# Patient Record
Sex: MALE | Race: WHITE | Employment: FULL TIME | URBAN - METROPOLITAN AREA
[De-identification: names, ages, dates, MRNs, and addresses within clinical notes are randomized per-mention and may not be internally consistent; named-entity substitution may affect disease eponyms.]

---

## 2018-04-18 ENCOUNTER — APPOINTMENT (OUTPATIENT)
Dept: GENERAL RADIOLOGY | Facility: CLINIC | Age: 28
End: 2018-04-18
Attending: EMERGENCY MEDICINE
Payer: COMMERCIAL

## 2018-04-18 ENCOUNTER — HOSPITAL ENCOUNTER (EMERGENCY)
Facility: CLINIC | Age: 28
Discharge: HOME | End: 2018-04-18
Attending: EMERGENCY MEDICINE | Admitting: EMERGENCY MEDICINE
Payer: COMMERCIAL

## 2018-04-18 ENCOUNTER — APPOINTMENT (OUTPATIENT)
Dept: CT IMAGING | Facility: CLINIC | Age: 28
End: 2018-04-18
Attending: EMERGENCY MEDICINE
Payer: COMMERCIAL

## 2018-04-18 VITALS
HEIGHT: 71 IN | WEIGHT: 264 LBS | SYSTOLIC BLOOD PRESSURE: 123 MMHG | DIASTOLIC BLOOD PRESSURE: 52 MMHG | RESPIRATION RATE: 20 BRPM | BODY MASS INDEX: 36.96 KG/M2 | OXYGEN SATURATION: 96 % | TEMPERATURE: 97.4 F | HEART RATE: 89 BPM

## 2018-04-18 DIAGNOSIS — R60.0 LOWER EXTREMITY EDEMA: ICD-10-CM

## 2018-04-18 DIAGNOSIS — R63.5 WEIGHT GAIN: ICD-10-CM

## 2018-04-18 DIAGNOSIS — R14.0 ABDOMINAL DISTENSION: ICD-10-CM

## 2018-04-18 DIAGNOSIS — R53.83 OTHER FATIGUE: ICD-10-CM

## 2018-04-18 DIAGNOSIS — R73.9 HYPERGLYCEMIA: ICD-10-CM

## 2018-04-18 LAB
ALBUMIN SERPL-MCNC: 3.4 G/DL (ref 3.4–5)
ALBUMIN UR-MCNC: 10 MG/DL
ALP SERPL-CCNC: 105 U/L (ref 40–150)
ALT SERPL W P-5'-P-CCNC: 182 U/L (ref 0–70)
ANION GAP SERPL CALCULATED.3IONS-SCNC: 11 MMOL/L (ref 3–14)
APPEARANCE UR: CLEAR
AST SERPL W P-5'-P-CCNC: 89 U/L (ref 0–45)
BASOPHILS # BLD AUTO: 0 10E9/L (ref 0–0.2)
BASOPHILS NFR BLD AUTO: 0.2 %
BILIRUB SERPL-MCNC: 0.3 MG/DL (ref 0.2–1.3)
BILIRUB UR QL STRIP: NEGATIVE
BUN SERPL-MCNC: 9 MG/DL (ref 7–30)
CALCIUM SERPL-MCNC: 8.8 MG/DL (ref 8.5–10.1)
CHLORIDE SERPL-SCNC: 107 MMOL/L (ref 94–109)
CO2 SERPL-SCNC: 29 MMOL/L (ref 20–32)
COLOR UR AUTO: YELLOW
CREAT SERPL-MCNC: 0.7 MG/DL (ref 0.66–1.25)
DIFFERENTIAL METHOD BLD: NORMAL
EOSINOPHIL # BLD AUTO: 0.3 10E9/L (ref 0–0.7)
EOSINOPHIL NFR BLD AUTO: 3.7 %
ERYTHROCYTE [DISTWIDTH] IN BLOOD BY AUTOMATED COUNT: 12.1 % (ref 10–15)
GFR SERPL CREATININE-BSD FRML MDRD: >90 ML/MIN/1.7M2
GLUCOSE SERPL-MCNC: 208 MG/DL (ref 70–99)
GLUCOSE UR STRIP-MCNC: NEGATIVE MG/DL
HCT VFR BLD AUTO: 40.9 % (ref 40–53)
HGB BLD-MCNC: 14.3 G/DL (ref 13.3–17.7)
HGB UR QL STRIP: NEGATIVE
IMM GRANULOCYTES # BLD: 0.1 10E9/L (ref 0–0.4)
IMM GRANULOCYTES NFR BLD: 1 %
KETONES UR STRIP-MCNC: NEGATIVE MG/DL
LEUKOCYTE ESTERASE UR QL STRIP: NEGATIVE
LIPASE SERPL-CCNC: 73 U/L (ref 73–393)
LYMPHOCYTES # BLD AUTO: 1.9 10E9/L (ref 0.8–5.3)
LYMPHOCYTES NFR BLD AUTO: 22.2 %
MCH RBC QN AUTO: 29.9 PG (ref 26.5–33)
MCHC RBC AUTO-ENTMCNC: 35 G/DL (ref 31.5–36.5)
MCV RBC AUTO: 85 FL (ref 78–100)
MONOCYTES # BLD AUTO: 0.8 10E9/L (ref 0–1.3)
MONOCYTES NFR BLD AUTO: 9.4 %
MUCOUS THREADS #/AREA URNS LPF: PRESENT /LPF
NEUTROPHILS # BLD AUTO: 5.5 10E9/L (ref 1.6–8.3)
NEUTROPHILS NFR BLD AUTO: 63.5 %
NITRATE UR QL: NEGATIVE
NRBC # BLD AUTO: 0 10*3/UL
NRBC BLD AUTO-RTO: 0 /100
NT-PROBNP SERPL-MCNC: 53 PG/ML (ref 0–450)
PH UR STRIP: 5.5 PH (ref 5–7)
PLATELET # BLD AUTO: 215 10E9/L (ref 150–450)
POTASSIUM SERPL-SCNC: 3.7 MMOL/L (ref 3.4–5.3)
PROT SERPL-MCNC: 6.9 G/DL (ref 6.8–8.8)
RBC # BLD AUTO: 4.79 10E12/L (ref 4.4–5.9)
RBC #/AREA URNS AUTO: 1 /HPF (ref 0–2)
SODIUM SERPL-SCNC: 147 MMOL/L (ref 133–144)
SOURCE: ABNORMAL
SP GR UR STRIP: 1.02 (ref 1–1.03)
UROBILINOGEN UR STRIP-MCNC: NORMAL MG/DL (ref 0–2)
WBC # BLD AUTO: 8.7 10E9/L (ref 4–11)
WBC #/AREA URNS AUTO: 1 /HPF (ref 0–5)

## 2018-04-18 PROCEDURE — 76705 ECHO EXAM OF ABDOMEN: CPT | Performed by: EMERGENCY MEDICINE

## 2018-04-18 PROCEDURE — 81001 URINALYSIS AUTO W/SCOPE: CPT | Performed by: EMERGENCY MEDICINE

## 2018-04-18 PROCEDURE — 25000125 ZZHC RX 250: Performed by: EMERGENCY MEDICINE

## 2018-04-18 PROCEDURE — 99284 EMERGENCY DEPT VISIT MOD MDM: CPT | Mod: 25 | Performed by: EMERGENCY MEDICINE

## 2018-04-18 PROCEDURE — 76705 ECHO EXAM OF ABDOMEN: CPT | Mod: 26 | Performed by: EMERGENCY MEDICINE

## 2018-04-18 PROCEDURE — 83880 ASSAY OF NATRIURETIC PEPTIDE: CPT | Performed by: EMERGENCY MEDICINE

## 2018-04-18 PROCEDURE — 74177 CT ABD & PELVIS W/CONTRAST: CPT

## 2018-04-18 PROCEDURE — 85025 COMPLETE CBC W/AUTO DIFF WBC: CPT | Performed by: EMERGENCY MEDICINE

## 2018-04-18 PROCEDURE — 71046 X-RAY EXAM CHEST 2 VIEWS: CPT

## 2018-04-18 PROCEDURE — 25000128 H RX IP 250 OP 636: Performed by: EMERGENCY MEDICINE

## 2018-04-18 PROCEDURE — 99285 EMERGENCY DEPT VISIT HI MDM: CPT | Mod: 25 | Performed by: EMERGENCY MEDICINE

## 2018-04-18 PROCEDURE — 80053 COMPREHEN METABOLIC PANEL: CPT | Performed by: EMERGENCY MEDICINE

## 2018-04-18 PROCEDURE — 83690 ASSAY OF LIPASE: CPT | Performed by: EMERGENCY MEDICINE

## 2018-04-18 RX ORDER — IOPAMIDOL 755 MG/ML
100 INJECTION, SOLUTION INTRAVASCULAR ONCE
Status: COMPLETED | OUTPATIENT
Start: 2018-04-18 | End: 2018-04-18

## 2018-04-18 RX ADMIN — SODIUM CHLORIDE 50 ML: 9 INJECTION, SOLUTION INTRAVENOUS at 05:31

## 2018-04-18 RX ADMIN — IOPAMIDOL 100 ML: 755 INJECTION, SOLUTION INTRAVENOUS at 05:31

## 2018-04-18 ASSESSMENT — ENCOUNTER SYMPTOMS
DIARRHEA: 1
ABDOMINAL PAIN: 1
NAUSEA: 0
FEVER: 0
SHORTNESS OF BREATH: 0
ABDOMINAL DISTENTION: 1
VOMITING: 0
FREQUENCY: 1
COUGH: 0

## 2018-04-18 NOTE — DISCHARGE INSTRUCTIONS
Follow up with a primary care clinic within the next 4-5 days. Return to the ER with new or worsening symptoms.

## 2018-04-18 NOTE — ED PROVIDER NOTES
History     Chief Complaint   Patient presents with     Abdominal Pain     patient reports feeling bloated, with intermittent diarrhea, no vomiting episodes. patient reported that he noticed his abdomen enlarged and gaine 40 lbs for 3 months     Joint Swelling     +1 pitting edema     HPI  Ankit Lopez is a 27 year old male who presents to the emergency department with a multitude of complaints.  His significant other states that over the last 3 months she has gained approximately 40 pounds.  She has noticed that his legs seem swollen, his abdomen seems distended, he seems to have no energy.  He has had chronic diarrhea, stooling approximately 5 times a day for many months.  She states he has refused to see the doctor.  He does admit that sometimes he has right-sided abdominal pain, though denies this now.  He is somewhat vague on what this feels like.  He states that he urinates a fair amount, and sometimes his urine is quite dark.  He denies alcohol use.  He states that times he feels winded, but more so related to extreme fatigue rather than true shortness of breath.  No chest symptoms.  No vomiting.  No fever.  Tonight, significant other felt that his tongue was swollen, convinced him to come in.    Past Medical History:   Diagnosis Date     ADHD (attention deficit hyperactivity disorder)      Asthma      Bipolar affective (H)      Depressive disorder        History reviewed. No pertinent surgical history.    No family history on file.    Social History   Substance Use Topics     Smoking status: Former Smoker     Smokeless tobacco: Former User     Alcohol use No         I have reviewed the Medications, Allergies, Past Medical and Surgical History, and Social History in the Epic system.    Review of Systems   Constitutional: Negative for fever.   Respiratory: Negative for cough and shortness of breath.    Cardiovascular: Positive for leg swelling. Negative for chest pain.   Gastrointestinal: Positive  "for abdominal distention, abdominal pain and diarrhea. Negative for nausea and vomiting.   Genitourinary: Positive for frequency.   All other systems reviewed and are negative.      Physical Exam   BP: 128/80  Pulse: 91  Temp: 97.4  F (36.3  C)  Resp: 20  Height: 180.3 cm (5' 11\")  Weight: 119.7 kg (264 lb)  SpO2: 96 %      Physical Exam   Constitutional: No distress.   HENT:   Head: Atraumatic.   Mouth/Throat: No oropharyngeal exudate.   Tongue looks whitish, with pink in the center.  No obvious swelling.   Eyes: Pupils are equal, round, and reactive to light. No scleral icterus.   Cardiovascular: Normal heart sounds and intact distal pulses.    Pulmonary/Chest: Breath sounds normal. No respiratory distress.   Abdominal: Soft. He exhibits distension. There is tenderness (Mild tenderness throughout the entire right side without any focal tenderness.).   Musculoskeletal: He exhibits edema (1+ bilatearl pitting edema). He exhibits no tenderness.   Skin: Skin is warm. No rash noted. He is not diaphoretic.       ED Course     ED Course     Procedures  Results for orders placed during the hospital encounter of 04/18/18   POC US ABDOMEN LIMITED    Impression No free fluid seen.             Critical Care time:  none             Labs Ordered and Resulted from Time of ED Arrival Up to the Time of Departure from the ED   COMPREHENSIVE METABOLIC PANEL - Abnormal; Notable for the following:        Result Value    Sodium 147 (*)     Glucose 208 (*)      (*)     AST 89 (*)     All other components within normal limits   ROUTINE UA WITH MICROSCOPIC - Abnormal; Notable for the following:     Protein Albumin Urine 10 (*)     Mucous Urine Present (*)     All other components within normal limits   CBC WITH PLATELETS DIFFERENTIAL   LIPASE   NT PROBNP INPATIENT            Assessments & Plan (with Medical Decision Making)   The pt's significant other was extremely concerned about what she perceives to be changes in his health " over months. Limited bedside U/S of his abdomen did not reveal any notable ascites. Basic labs were checked, and were revealing for elevated ALT and AST, as well as a blood sugar of 208. I did explain that he likely has DM, but given that his sugar is not critically high, I feel it's best that he establish care with a PMD to initiate treatment. CXR was done, which was not remarkable, and ABD CT was offered, which showed trace non-specific free fluid in the pelvis, as well as a notably distended stomach. No emergent cause for his sx was found. No obvious swelling of his tongue. He needs to establish care, as he will need care for his hyperglycemia as well as further evaluation for his ongoing diarrhea and elevated LFTs. No sign of CHF on cxr, though he does have 1+ bilateral pedal edema. His significant other states that she has a clinic in mind of where she would like to take him. She was given print outs of his labs/results from today.     I have reviewed the nursing notes.    I have reviewed the findings, diagnosis, plan and need for follow up with the patient.    There are no discharge medications for this patient.      Final diagnoses:   Hyperglycemia   Abdominal distension   Lower extremity edema   Other fatigue   Weight gain       4/18/2018   [facility], [place], EMERGENCY DEPARTMENT     Angelique Mederos MD  04/18/18 9125

## 2018-04-18 NOTE — ED AVS SNAPSHOT
[facility], Emergency Department    [address]    [place] [address]    Phone:  [phone]    Fax:  [phone]                                       Ankit Lopez   MRN: [id number]    Department:  [facility], Emergency Department   Date of Visit:  4/18/2018           Patient Information     Date Of Birth          1990        Your diagnoses for this visit were:     Hyperglycemia     Abdominal distension     Lower extremity edema     Other fatigue     Weight gain        You were seen by Angelique Mederos MD.        Discharge Instructions       Follow up with a primary care clinic within the next 4-5 days. Return to the ER with new or worsening symptoms.     Discharge References/Attachments     HYPERGLYCEMIA (HIGH BLOOD SUGAR) (ENGLISH)    LEG SWELLING IN BOTH LEGS (ENGLISH)      24 Hour Appointment Hotline       To make an appointment at any [facility] clinic, call [phone] ([phone]). If you don't have a family doctor or clinic, we will help you find one. [facility] clinics are conveniently located to serve the needs of you and your family.             Review of your medicines      Notice     You have not been prescribed any medications.            Procedures and tests performed during your visit     CBC with platelets differential    CT Abdomen Pelvis w Contrast    Chest XR,  PA & LAT    Comprehensive metabolic panel    Lipase    Nt probnp inpatient    POC US ABDOMEN LIMITED    UA with Microscopic      Orders Needing Specimen Collection     None      Pending Results     Date and Time Order Name Status Description    4/18/2018 0333 POC US ABDOMEN LIMITED In process             Pending Culture Results     No orders found from 4/16/2018 to 4/19/2018.            Pending Results Instructions     If you had any lab results that were not finalized at the time of your Discharge, you can call the ED Lab Result RN at [phone]. You will be contacted by this team for any  "positive Lab results or changes in treatment. The nurses are available 7 days a week from 10A to 6:30P.  You can leave a message 24 hours per day and they will return your call.        Thank you for choosing [facility]       Thank you for choosing [facility] for your care. Our goal is always to provide you with excellent care. Hearing back from our patients is one way we can continue to improve our services. Please take a few minutes to complete the written survey that you may receive in the mail after you visit with us. Thank you!        [employer] Information     [employer] lets you send messages to your doctor, view your test results, renew your prescriptions, schedule appointments and more. To sign up, go to [email or web address] . Click on \"Log in\" on the left side of the screen, which will take you to the Welcome page. Then click on \"Sign up Now\" on the right side of the page.     You will be asked to enter the access code listed below, as well as some personal information. Please follow the directions to create your username and password.     Your access code is: [email or web address]  Expires: 2018  6:11 AM     Your access code will  in 90 days. If you need help or a new code, please call your [facility] clinic or [phone].        Care EveryWhere ID     This is your Care EveryWhere ID. This could be used by other organizations to access your [facility] medical records  [id number]        Equal Access to Services     KAE GORE : Hadii ann marie jefferson Soanel, waaxda luqadaha, qaybta kaalmada catie, thee andino . So [facility] [phone].    ATENCIÓN: Si habla español, tiene a sarah disposición servicios gratuitos de asistencia lingüística. Llame al [phone].    We comply with applicable federal civil rights laws and [place] laws. We do not discriminate on the basis of race, color, national origin, age, disability, sex, sexual orientation, or gender identity.            After " Visit Summary       This is your record. Keep this with you and show to your community pharmacist(s) and doctor(s) at your next visit.

## 2018-04-18 NOTE — ED AVS SNAPSHOT
[facility], Emergency Department    [address]    [facility] [address]    Phone:  [phone]    Fax:  [phone]                                       Ankit Lopez   MRN: [id number]    Department:  [facility], Emergency Department   Date of Visit:  4/18/2018           After Visit Summary Signature Page     I have received my discharge instructions, and my questions have been answered. I have discussed any challenges I see with this plan with the nurse or doctor.    ..........................................................................................................................................  Patient/Patient Representative Signature      ..........................................................................................................................................  Patient Representative Print Name and Relationship to Patient    ..................................................               ................................................  Date                                            Time    ..........................................................................................................................................  Reviewed by Signature/Title    ...................................................              ..............................................  Date                                                            Time

## 2018-11-23 ENCOUNTER — HOSPITAL ENCOUNTER (EMERGENCY)
Facility: CLINIC | Age: 28
Discharge: HOME | End: 2018-11-23
Attending: EMERGENCY MEDICINE | Admitting: EMERGENCY MEDICINE
Payer: COMMERCIAL

## 2018-11-23 VITALS
SYSTOLIC BLOOD PRESSURE: 125 MMHG | OXYGEN SATURATION: 99 % | HEART RATE: 75 BPM | WEIGHT: 221.6 LBS | RESPIRATION RATE: 16 BRPM | BODY MASS INDEX: 31.02 KG/M2 | TEMPERATURE: 97.8 F | DIASTOLIC BLOOD PRESSURE: 68 MMHG | HEIGHT: 71 IN

## 2018-11-23 DIAGNOSIS — R29.810 FACIAL DROOP: ICD-10-CM

## 2018-11-23 PROCEDURE — 99283 EMERGENCY DEPT VISIT LOW MDM: CPT | Performed by: EMERGENCY MEDICINE

## 2018-11-23 PROCEDURE — 93010 ELECTROCARDIOGRAM REPORT: CPT | Mod: Z6 | Performed by: EMERGENCY MEDICINE

## 2018-11-23 PROCEDURE — 99284 EMERGENCY DEPT VISIT MOD MDM: CPT | Mod: 25 | Performed by: EMERGENCY MEDICINE

## 2018-11-23 PROCEDURE — 93005 ELECTROCARDIOGRAM TRACING: CPT | Performed by: EMERGENCY MEDICINE

## 2018-11-24 NOTE — ED NOTES
Patient refusing lab draws and test and patient's spouse states that physician is incompetent and that they want to go to another hospital; MD informed of patient's concern.

## 2018-11-24 NOTE — ED NOTES
Patient presents to ED with c/o drooping face and states that symptoms started this evening; patient also c/o decreased hearing in left ear; patient states he has also been experiencing intermittent dizziness for a couple of times over the last few months; patient also c/o slurred speech and experienced two episodes today, but went away; patient has history PTSD and uses prescription canibis oil; pt. Denies chest pain, numbness, or weakness; continuous cardiac monitoring applied and continuous pulse oxymeter on; spouse at bedside.

## 2018-11-24 NOTE — ED PROVIDER NOTES
"  History     Chief Complaint   Patient presents with     Facial Droop     noted it \"for awhile\" slurred speech noted \" a few days\"     HPI  Ankit Lopez is a 28 year old male with PMH including Bipolar, PTSD, and past transaminitis who is presenting for 4-5 days of reported slurred speech and 1 day of facial droop. Patient reports that for the last 4-5 days his wife and friends reported that he has been slurring his speech. The wife confirms it is not word-finding difficulty. Today the patient's friend told him one eye looked a little lower than the other and his wife then brought him in to the hospital. He also reports decreased hearing in his L ear for several days. The patient denies any vision trouble, confusion, memory loss, numbness, weakness, tingling, chest pain, SOB. He endorses medical marijuana use but denies any other drug or alcohol use.      I have reviewed the Medications, Allergies, Past Medical and Surgical History, and Social History in the Epic system.    Review of Systems: 10 pt ROS negative except as above in HPI    Physical Exam   BP: 125/68  Pulse: 75  Temp: 97.8  F (36.6  C)  Resp: 16  Height: 180.3 cm (5' 11\")  Weight: 100.5 kg (221 lb 9.6 oz)  SpO2: 99 %      Physical Exam   General: alert, awake, lying in bed in no acute distress  HEENT: NC/AT, no rhinorrhea or congestion, no scleral icterus, no oral lesions. L cheek appears slightly swollen.  Cardiovascular: RRR, no m/g/r  Respiratory: Lungs CTAB, no wheezing or crackles  Abdominal: Soft, non-tender, non-distended  Extremities: Atraumatic, well-perfused, no edema  Skin: No rashes, lesions, or jaundice  Neuro: Alert and oriented x 3, able to follow simple commands. CN II-XII intact except for very slight L facial droop. Wrinkles on forehead and nasolabial fold present on L side. No slurred speech audible. Strength and sensation intact in all four extremities.   Psychiatry: Cooperative with interview, increased speech latency, " "frequently laughing      ED Course     ED Course   Dictation Disclaimer: These note that this medical chart was completed with an electronic voice recognition dictation system (dragon).  Even though efforts have been made to review and edit this chart, it is possible that some warts may have been transcribed by error in my dictation.  This is simply due to normal deficiencies and variance that may happen while using a voice recognition dictation.    This data collected with the Resident working in the Emergency Department.  Patient was seen and evaluated by myself and I repeated the history and physical exam with the patient.  The plan of care was discussed with them.  The key portions of the note including the entire assessment and plan reflect my documentation.    I saw this patient along with the resident patient had come in for evaluation of possible slurring speech with some facial drop that been ongoing since the last of 4-5 days.  Patient came in with atypical presentation for stroke patient does admit to marijuana use.  Patient appeared stable in no acute distress according to patient he has has not noted any symptoms however the wife is the one to assess that the patient had slurred speech.. We ordered a workup to include CT scan of the brain blood work and after the CT scan and MRI of the brain was going to be done and the patient was going to be transferred to his back for further neurological workup however, patient's wife was not happy with the initial treatment she received by the staff including myself and the resident.  She said that she was going to take the patient somewhere else.  Were canceled and patient was discharged they wish.  EKG at this patient was done that was within normal limits CT scan and blood work was still pending.    Procedures             EKG Interpretation:      Interpreted by Dr Martinez  Time reviewed: 11:00 pm  Symptoms at time of EKG: \"facial droop\"   Rhythm: normal sinus "   Rate: normal  Axis: normal  Ectopy: none  Conduction: normal  ST Segments/ T Waves: No ST-T wave changes  Q Waves: none  Comparison to prior: No old EKG available    Clinical Impression: normal EKG      Critical Care time:  none  Assessments & Plan (with Medical Decision Making)   Ankit Lopez is a 28 year old male with PMH including Bipolar, PTSD, and past transaminitis who is presenting for 4-5 days of reported slurred speech and 1 day of facial droop. Initial differential included Bell's Palsy, intracranial hemorrhage, ischemic stroke, and drug or alcohol intoxication. On exam patient exhibited very slight L facial droop with no slurred speech, and appeared intoxicated. Stroke work-up including labs head CT was initiated, although patient's wife became upset that we were not initiating the workup with an MRI. Team attempted to  patient and family on the benefits of starting workup with CT scan, although the patient then declined treatment and left the ED to go to another hospital.     I have reviewed the nursing notes.    I have reviewed the findings, diagnosis, plan and need for follow up with the patient.    New Prescriptions    No medications on file       Final diagnoses:   None     Adrian Ahumada MD  Psychiatry PGY-1    11/23/2018   [facility], [place], EMERGENCY DEPARTMENT     Guevara Martinez MD  11/23/18 5154

## 2018-11-24 NOTE — ED NOTES
Patient insist on leaving and refused all test, lab draws and procedures; patient signed declination form while A&O times 4.

## 2018-11-25 LAB — INTERPRETATION ECG - MUSE: NORMAL

## 2019-11-16 ENCOUNTER — HOSPITAL ENCOUNTER (EMERGENCY)
Facility: CLINIC | Age: 29
Discharge: HOME | End: 2019-11-16
Attending: EMERGENCY MEDICINE | Admitting: EMERGENCY MEDICINE

## 2019-11-16 ENCOUNTER — APPOINTMENT (OUTPATIENT)
Dept: GENERAL RADIOLOGY | Facility: CLINIC | Age: 29
End: 2019-11-16
Attending: EMERGENCY MEDICINE

## 2019-11-16 VITALS
DIASTOLIC BLOOD PRESSURE: 58 MMHG | SYSTOLIC BLOOD PRESSURE: 107 MMHG | HEART RATE: 65 BPM | BODY MASS INDEX: 30.91 KG/M2 | OXYGEN SATURATION: 100 % | RESPIRATION RATE: 16 BRPM | TEMPERATURE: 97.5 F | HEIGHT: 71 IN

## 2019-11-16 DIAGNOSIS — R07.9 LEFT-SIDED CHEST PAIN: ICD-10-CM

## 2019-11-16 DIAGNOSIS — R07.89 OTHER CHEST PAIN: ICD-10-CM

## 2019-11-16 LAB
ANION GAP SERPL CALCULATED.3IONS-SCNC: 8 MMOL/L (ref 3–14)
BASOPHILS # BLD AUTO: 0 10E9/L (ref 0–0.2)
BASOPHILS NFR BLD AUTO: 0.1 %
BUN SERPL-MCNC: 14 MG/DL (ref 7–30)
CALCIUM SERPL-MCNC: 9 MG/DL (ref 8.5–10.1)
CHLORIDE SERPL-SCNC: 106 MMOL/L (ref 94–109)
CO2 SERPL-SCNC: 26 MMOL/L (ref 20–32)
CREAT SERPL-MCNC: 0.82 MG/DL (ref 0.66–1.25)
DIFFERENTIAL METHOD BLD: ABNORMAL
EOSINOPHIL # BLD AUTO: 0.2 10E9/L (ref 0–0.7)
EOSINOPHIL NFR BLD AUTO: 1 %
ERYTHROCYTE [DISTWIDTH] IN BLOOD BY AUTOMATED COUNT: 12.1 % (ref 10–15)
GFR SERPL CREATININE-BSD FRML MDRD: >90 ML/MIN/{1.73_M2}
GLUCOSE SERPL-MCNC: 118 MG/DL (ref 70–99)
HCT VFR BLD AUTO: 46.3 % (ref 40–53)
HGB BLD-MCNC: 16.6 G/DL (ref 13.3–17.7)
IMM GRANULOCYTES # BLD: 0.1 10E9/L (ref 0–0.4)
IMM GRANULOCYTES NFR BLD: 0.2 %
LYMPHOCYTES # BLD AUTO: 2.3 10E9/L (ref 0.8–5.3)
LYMPHOCYTES NFR BLD AUTO: 10.7 %
MCH RBC QN AUTO: 31.1 PG (ref 26.5–33)
MCHC RBC AUTO-ENTMCNC: 35.9 G/DL (ref 31.5–36.5)
MCV RBC AUTO: 87 FL (ref 78–100)
MONOCYTES # BLD AUTO: 1.5 10E9/L (ref 0–1.3)
MONOCYTES NFR BLD AUTO: 7 %
NEUTROPHILS # BLD AUTO: 17.2 10E9/L (ref 1.6–8.3)
NEUTROPHILS NFR BLD AUTO: 81 %
NRBC # BLD AUTO: 0 10*3/UL
NRBC BLD AUTO-RTO: 0 /100
PLATELET # BLD AUTO: 246 10E9/L (ref 150–450)
POTASSIUM SERPL-SCNC: 3.5 MMOL/L (ref 3.4–5.3)
RBC # BLD AUTO: 5.34 10E12/L (ref 4.4–5.9)
SODIUM SERPL-SCNC: 140 MMOL/L (ref 133–144)
TROPONIN I SERPL-MCNC: <0.015 UG/L (ref 0–0.04)
TROPONIN I SERPL-MCNC: <0.015 UG/L (ref 0–0.04)
WBC # BLD AUTO: 21.2 10E9/L (ref 4–11)

## 2019-11-16 PROCEDURE — 80048 BASIC METABOLIC PNL TOTAL CA: CPT | Performed by: EMERGENCY MEDICINE

## 2019-11-16 PROCEDURE — 25000132 ZZH RX MED GY IP 250 OP 250 PS 637: Performed by: EMERGENCY MEDICINE

## 2019-11-16 PROCEDURE — 71046 X-RAY EXAM CHEST 2 VIEWS: CPT

## 2019-11-16 PROCEDURE — 93005 ELECTROCARDIOGRAM TRACING: CPT

## 2019-11-16 PROCEDURE — 93010 ELECTROCARDIOGRAM REPORT: CPT | Mod: Z6 | Performed by: EMERGENCY MEDICINE

## 2019-11-16 PROCEDURE — 99285 EMERGENCY DEPT VISIT HI MDM: CPT | Mod: 25

## 2019-11-16 PROCEDURE — 99284 EMERGENCY DEPT VISIT MOD MDM: CPT | Mod: 25 | Performed by: EMERGENCY MEDICINE

## 2019-11-16 PROCEDURE — 93308 TTE F-UP OR LMTD: CPT

## 2019-11-16 PROCEDURE — 93308 TTE F-UP OR LMTD: CPT | Mod: 26 | Performed by: EMERGENCY MEDICINE

## 2019-11-16 PROCEDURE — 85025 COMPLETE CBC W/AUTO DIFF WBC: CPT | Performed by: EMERGENCY MEDICINE

## 2019-11-16 PROCEDURE — 84484 ASSAY OF TROPONIN QUANT: CPT | Mod: 91 | Performed by: EMERGENCY MEDICINE

## 2019-11-16 RX ORDER — ASPIRIN 81 MG/1
324 TABLET, CHEWABLE ORAL ONCE
Status: COMPLETED | OUTPATIENT
Start: 2019-11-16 | End: 2019-11-16

## 2019-11-16 RX ADMIN — ASPIRIN 81 MG CHEWABLE TABLET 324 MG: 81 TABLET CHEWABLE at 05:20

## 2019-11-16 NOTE — ED PROVIDER NOTES
"  History     Chief Complaint   Patient presents with     Chest Pain     c/o pain to left anterior chest. Onset was around 0200 today. Stated it wAS steady pain for 30 to 45 minutes. Now it comes and go. Patient stated it is hard to breath      HPI  Ankit Lopez is a 29 year old male with PMH notable for asthma, ADHD, bipolar who presents to the ED with chest pain. 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 Pain worsening over the 45 minutes after. Pain is worsened at the end of a deep breath. No shortness of breath, no history of DVT/PE, no leg pain nor swelling, no recent travel, no malignancy history. No fever, no cough, no vomiting. Pain feels \"like a stick is stuck there\". He points to left anterior chest as location.     I have reviewed the Medications, Allergies, Past Medical and Surgical History, and Social History in the Epic system.    Review of Systems  A complete review of systems was performed with pertinent positives and negatives noted in the HPI, and all other systems negative.     Physical Exam   BP: (!) 150/79  Pulse: 90  Heart Rate: 76  Temp: 97.5  F (36.4  C)  Resp: 18  Height: 180.3 cm (5' 11\")  Weight: (pt having chest PAIN. wILL CHECK ONCE stable)  SpO2: 100 %    Physical Exam  General: no acute distress. Appears stated age.   HENT: MMM, no oropharyngeal lesions  Eyes: PERRL, normal sclerae  Neck: non-tender, supple  Cardio: regular rate. Regular rhythm. Extremities well perfused  Resp: Normal work of breathing, clear breath sounds  Chest/Back: no visual signs of trauma, no CVA tenderness. Pain not reproduced with palpation.   Abdomen: no tenderness, non-distended, no rebound, no guarding  Neuro: alert and fully oriented. CN II-XII grossly intact. Grossly normal strength and sensation in all extremities.   MSK: no deformities.   Integumentary/Skin: no rash visualized, normal color  Psych: normal affect, normal behavior    ED Course      Procedures  Results for " orders placed during the hospital encounter of 11/16/19   POC US ECHO LIMITED    Impression Limited Bedside ED Cardiac Ultrasound  PROCEDURE: PERFORMED BY: Dr. Jeremy Blanco MD  INDICATIONS/SYMPTOM:  Chest Pain  PROBE: Cardiac phased array probe  BODY LOCATION: Chest (cardiac)  FINDINGS: The ultrasound was performed utilizing the subcostal, parasternal long axis, parasternal short axis and apical 4 chamber views.  Grossly normal LV contractility. No RV dilation visualized. No pericardial effusion visualized.   INTERPRETATION:    Grossly normal LV contractility. No pericardial effusion. No RV dilation.   IMAGE DOCUMENTATION: Images were archived to PACs system.    Limited Bedside ED Ultrasound of Thorax:  PROCEDURE: PERFORMED BY: Dr. Jeremy Blanco MD  INDICATIONS/SYMPTOM:  Chest pain  PROBE: Cardiac phased array probe  BODY LOCATION: Chest (Lungs)  FINDINGS: Images of both lung hemithoracies taken in 2D in multiple rib spaces  Left lung: Sliding signs intact. Minimal B-lines. Lung base without visualized fluid above the diaphragm.   Right lung: Sliding signs intact. Minimal B-lines. Lung base without visualized fluid above the diaphragm.   INTERPRETATION: No evidence of pulmonary edema, no evidence of pleural effusion, no evidence of pneumothorax.   IMAGE DOCUMENTATION: Images were archived to PACs system.               EKG Interpretation:      Interpreted by Jeremy Blanco MD  Time reviewed: 0515  Symptoms at time of EKG: chest pain   Rhythm: normal sinus   Rate: Normal  Axis: Normal  Ectopy: none  Conduction: normal  ST Segments/ T Waves: No ST-T wave changes and No acute ischemic changes  Q Waves: very small Q in I, II - similar to previous  Comparison to prior: Unchanged from 11/23/2018    Clinical Impression: normal EKG    Critical Care time:  none       Labs Ordered and Resulted from Time of ED Arrival Up to the Time of Departure from the ED   CBC WITH PLATELETS DIFFERENTIAL - Abnormal; Notable  for the following components:       Result Value    WBC 21.2 (*)     Absolute Neutrophil 17.2 (*)     Absolute Monocytes 1.5 (*)     All other components within normal limits   BASIC METABOLIC PANEL - Abnormal; Notable for the following components:    Glucose 118 (*)     All other components within normal limits   TROPONIN I   TROPONIN I   CARDIAC CONTINUOUS MONITORING            Assessments & Plan (with Medical Decision Making)   Patient presenting with left side chest pain that started about 3 hours prior to presentation. Vitals in the ED unremarkable - HR and SpO2 normal. Initial differential diagnosis includes but not limited to ACS, PE, PTX, musculoskeletal pain.     ECG showed NSR without evidence of acute ischemia, troponin negative x2 - ACS very unlikely. PERC met - PE very unlikely. Bedside cardiac and thoracic ultrasound showed grossly normal LV contractility, no RV dilation, no pericardial effusion, no evidence of pneumothorax, no evidence of pulmonary edema, no evidence of pleural effusion. CXR without evidence of pneumonia, mediastinal widening, nor other visualized pathology. Leukocytosis present with unclear cause - no fever and no infectious symptoms. Pain greatly improved during ED course, nearly resolved.     The complete clinical picture is most consistent with atypical left side chest pain. After counseling on the diagnosis, work-up, and treatment plan, the patient was discharged to home. The patient was advised to follow-up with his PCP in a few days. The patient was advised to return to the ED if worsening symptoms, shortness of breath, or if there are any urgent/life-threatening concerns.     Final diagnoses:   Left-sided chest pain       --  Jeremy Blanco MD   Emergency Medicine     I have reviewed the nursing notes.  I have reviewed the findings, diagnosis, plan and need for follow up with the patient.  11/16/2019   [facility], [place], EMERGENCY DEPARTMENT     Jeremy Blanco,  MD  11/16/19 0750

## 2019-11-16 NOTE — DISCHARGE INSTRUCTIONS
Please make an appointment to follow up with Your Primary Care Provider in 2-5 days.     Return to the ED if you are having shortness of breath, worsening symptoms, or any urgent/life-threatening concerns.

## 2019-11-18 LAB — INTERPRETATION ECG - MUSE: NORMAL

## 2020-01-18 ENCOUNTER — HOSPITAL ENCOUNTER (EMERGENCY)
Facility: CLINIC | Age: 30
Discharge: HOME | End: 2020-01-18
Attending: EMERGENCY MEDICINE | Admitting: EMERGENCY MEDICINE

## 2020-01-18 ENCOUNTER — APPOINTMENT (OUTPATIENT)
Dept: CT IMAGING | Facility: CLINIC | Age: 30
End: 2020-01-18
Attending: EMERGENCY MEDICINE

## 2020-01-18 VITALS
RESPIRATION RATE: 18 BRPM | TEMPERATURE: 97.9 F | SYSTOLIC BLOOD PRESSURE: 115 MMHG | DIASTOLIC BLOOD PRESSURE: 76 MMHG | OXYGEN SATURATION: 97 % | WEIGHT: 230 LBS | BODY MASS INDEX: 32.08 KG/M2 | HEART RATE: 72 BPM

## 2020-01-18 DIAGNOSIS — R31.9 URINARY TRACT INFECTION WITH HEMATURIA, SITE UNSPECIFIED: ICD-10-CM

## 2020-01-18 DIAGNOSIS — N39.0 URINARY TRACT INFECTION WITH HEMATURIA, SITE UNSPECIFIED: ICD-10-CM

## 2020-01-18 LAB
ALBUMIN UR-MCNC: 100 MG/DL
APPEARANCE UR: ABNORMAL
BACTERIA #/AREA URNS HPF: ABNORMAL /HPF
BILIRUB UR QL STRIP: NEGATIVE
CAOX CRY #/AREA URNS HPF: ABNORMAL /HPF
COLOR UR AUTO: ABNORMAL
GLUCOSE UR STRIP-MCNC: NEGATIVE MG/DL
HGB UR QL STRIP: ABNORMAL
HYALINE CASTS #/AREA URNS LPF: 19 /LPF (ref 0–2)
KETONES UR STRIP-MCNC: 10 MG/DL
LEUKOCYTE ESTERASE UR QL STRIP: ABNORMAL
MUCOUS THREADS #/AREA URNS LPF: PRESENT /LPF
NITRATE UR QL: NEGATIVE
PH UR STRIP: 6 PH (ref 5–7)
RBC #/AREA URNS AUTO: >182 /HPF (ref 0–2)
SOURCE: ABNORMAL
SP GR UR STRIP: 1.03 (ref 1–1.03)
SPERM #/AREA URNS HPF: PRESENT /HPF
UROBILINOGEN UR STRIP-MCNC: 2 MG/DL (ref 0–2)
WBC #/AREA URNS AUTO: >182 /HPF (ref 0–5)

## 2020-01-18 PROCEDURE — 81001 URINALYSIS AUTO W/SCOPE: CPT | Performed by: EMERGENCY MEDICINE

## 2020-01-18 PROCEDURE — 25000125 ZZHC RX 250: Performed by: EMERGENCY MEDICINE

## 2020-01-18 PROCEDURE — 87086 URINE CULTURE/COLONY COUNT: CPT | Performed by: EMERGENCY MEDICINE

## 2020-01-18 PROCEDURE — 96372 THER/PROPH/DIAG INJ SC/IM: CPT | Performed by: EMERGENCY MEDICINE

## 2020-01-18 PROCEDURE — 99284 EMERGENCY DEPT VISIT MOD MDM: CPT | Mod: Z6 | Performed by: EMERGENCY MEDICINE

## 2020-01-18 PROCEDURE — 87088 URINE BACTERIA CULTURE: CPT | Performed by: EMERGENCY MEDICINE

## 2020-01-18 PROCEDURE — 87186 SC STD MICRODIL/AGAR DIL: CPT | Performed by: EMERGENCY MEDICINE

## 2020-01-18 PROCEDURE — 25000132 ZZH RX MED GY IP 250 OP 250 PS 637: Performed by: EMERGENCY MEDICINE

## 2020-01-18 PROCEDURE — 74176 CT ABD & PELVIS W/O CONTRAST: CPT

## 2020-01-18 PROCEDURE — 25000128 H RX IP 250 OP 636: Performed by: EMERGENCY MEDICINE

## 2020-01-18 PROCEDURE — 99285 EMERGENCY DEPT VISIT HI MDM: CPT | Mod: 25 | Performed by: EMERGENCY MEDICINE

## 2020-01-18 RX ORDER — DOXYCYCLINE 100 MG/1
100 CAPSULE ORAL 2 TIMES DAILY
Qty: 20 CAPSULE | Refills: 0 | Status: SHIPPED | OUTPATIENT
Start: 2020-01-18 | End: 2020-01-28

## 2020-01-18 RX ORDER — DOXYCYCLINE 100 MG/1
100 CAPSULE ORAL ONCE
Status: COMPLETED | OUTPATIENT
Start: 2020-01-18 | End: 2020-01-18

## 2020-01-18 RX ORDER — CEFDINIR 300 MG/1
300 CAPSULE ORAL 2 TIMES DAILY
Qty: 14 CAPSULE | Refills: 0 | Status: SHIPPED | OUTPATIENT
Start: 2020-01-18 | End: 2020-01-25

## 2020-01-18 RX ORDER — CEFDINIR 300 MG/1
300 CAPSULE ORAL ONCE
Status: COMPLETED | OUTPATIENT
Start: 2020-01-18 | End: 2020-01-18

## 2020-01-18 RX ADMIN — CEFDINIR 300 MG: 300 CAPSULE ORAL at 04:08

## 2020-01-18 RX ADMIN — LIDOCAINE HYDROCHLORIDE 250 MG: 10 INJECTION, SOLUTION EPIDURAL; INFILTRATION; INTRACAUDAL; PERINEURAL at 04:06

## 2020-01-18 RX ADMIN — DOXYCYCLINE 100 MG: 100 CAPSULE ORAL at 04:02

## 2020-01-18 ASSESSMENT — ENCOUNTER SYMPTOMS
HEADACHES: 0
ARTHRALGIAS: 0
NECK STIFFNESS: 0
FEVER: 0
RHINORRHEA: 0
FREQUENCY: 1
NECK PAIN: 0
JOINT SWELLING: 0
DYSURIA: 0
CHEST TIGHTNESS: 0
SORE THROAT: 0
LIGHT-HEADEDNESS: 0
BRUISES/BLEEDS EASILY: 0
WOUND: 0
NAUSEA: 0
MYALGIAS: 0
HEMATURIA: 1
SHORTNESS OF BREATH: 0
DIZZINESS: 0
DIARRHEA: 0
COUGH: 0
CONFUSION: 0
ABDOMINAL PAIN: 0
BACK PAIN: 0
PALPITATIONS: 0
DIAPHORESIS: 0
FLANK PAIN: 0
VOMITING: 0
CHILLS: 0

## 2020-01-18 NOTE — ED PROVIDER NOTES
[facility] EMERGENCY DEPARTMENT ([facility])     January 18, 2020  History     Chief Complaint   Patient presents with     Hematuria     Patient reports tonight spot of blood in urine      Urinary Retention     Patient reports feeling like he has to constantly use bathroom and unable to empty bathroom for last week     The history is provided by the patient and medical records.     Ankit Lopez is a 29 year old male with a past medical history for asthma, depression, ADHD, and bipolar affective disorder who presents to the Emergency Department for complaints of urinary frequency and discomfort.  Patient complains onset of symptoms that started about a week ago.  Patient reports of having blood in his urine prior to arrival.  He denies of urinary pain when urinating, only discomfort.  Patient states he works as a  where he often holds in his urine for extended periods of time.  He denies previous medical history for kidney stones.  Patient denies penile discharges.  He denies fever, chills, abdominal pain, nausea or vomiting.  Has pain in penis when urinates. No abdominal pain or flank pain. No back pain.   Past Medical History:   Diagnosis Date     ADHD (attention deficit hyperactivity disorder)      Asthma      Bipolar affective (H)      Depressive disorder      History reviewed. No pertinent surgical history.    History reviewed. No pertinent family history.    Social History     Tobacco Use     Smoking status: Current Every Day Smoker     Packs/day: 0.25     Smokeless tobacco: Former User   Substance Use Topics     Alcohol use: Yes     Comment: occassionally      No current facility-administered medications for this encounter.      Current Outpatient Medications   Medication     NONFORMULARY      No Known Allergies    I have reviewed the Medications, Allergies, Past Medical and Surgical History, and Social History in the Epic system.    Review of Systems   Constitutional: Negative  for chills, diaphoresis and fever.   HENT: Negative for congestion, rhinorrhea and sore throat.    Respiratory: Negative for cough, chest tightness and shortness of breath.    Cardiovascular: Negative for chest pain, palpitations and leg swelling.   Gastrointestinal: Negative for abdominal pain, diarrhea, nausea and vomiting.   Genitourinary: Positive for frequency, hematuria, penile pain and urgency. Negative for discharge, dysuria, flank pain, penile swelling, scrotal swelling and testicular pain.   Musculoskeletal: Negative for arthralgias, back pain, joint swelling, myalgias, neck pain and neck stiffness.   Skin: Negative for rash and wound.   Allergic/Immunologic: Negative for immunocompromised state.   Neurological: Negative for dizziness, syncope, light-headedness and headaches.   Hematological: Does not bruise/bleed easily.   Psychiatric/Behavioral: Negative for confusion.   All other systems reviewed and are negative.      Physical Exam   BP: 125/55  Pulse: 69  Temp: 95.6  F (35.3  C)  Resp: 16  Weight: 104.3 kg (230 lb)  SpO2: 94 %      Physical Exam  Vitals signs and nursing note reviewed.   Constitutional:       General: He is not in acute distress.     Appearance: Normal appearance. He is not ill-appearing or diaphoretic.   HENT:      Head: Normocephalic and atraumatic.      Right Ear: Tympanic membrane, ear canal and external ear normal.      Left Ear: Tympanic membrane, ear canal and external ear normal.      Nose: Nose normal.      Mouth/Throat:      Mouth: Mucous membranes are moist.      Pharynx: Oropharynx is clear.   Eyes:      General: No scleral icterus.     Extraocular Movements: Extraocular movements intact.      Conjunctiva/sclera: Conjunctivae normal.      Pupils: Pupils are equal, round, and reactive to light.   Cardiovascular:      Rate and Rhythm: Normal rate and regular rhythm.      Pulses: Normal pulses.      Heart sounds: Normal heart sounds.   Pulmonary:      Effort: Pulmonary effort  is normal. No respiratory distress.      Breath sounds: Normal breath sounds.   Abdominal:      General: Bowel sounds are normal.      Palpations: Abdomen is soft.      Tenderness: There is no abdominal tenderness. There is no right CVA tenderness or left CVA tenderness.   Genitourinary:     Penis: Normal.       Scrotum/Testes: Normal.   Musculoskeletal: Normal range of motion.         General: No swelling or tenderness.   Skin:     General: Skin is warm and dry.      Findings: No rash.   Neurological:      General: No focal deficit present.      Mental Status: He is alert and oriented to person, place, and time.   Psychiatric:         Mood and Affect: Mood normal.         Behavior: Behavior normal.         ED Course   12:53 AM  The patient was seen and examined by Barney Doe MD, in Room ED18.      Procedures             Critical Care time:  none             Labs Ordered and Resulted from Time of ED Arrival Up to the Time of Departure from the ED   ROUTINE UA WITH MICROSCOPIC REFLEX TO CULTURE - Abnormal; Notable for the following components:       Result Value    Ketones Urine 10 (*)     Blood Urine Large (*)     Protein Albumin Urine 100 (*)     Leukocyte Esterase Urine Large (*)     WBC Urine >182 (*)     RBC Urine >182 (*)     Bacteria Urine Few (*)     Mucous Urine Present (*)     sperm Present (*)     Hyaline Casts 19 (*)     Calcium Oxalate Many (*)     All other components within normal limits     Abd/pelvis CT no contrast - Stone Protocol   Final Result   IMPRESSION:    1.  No visible renal or ureteral calculi. No hydronephrosis.   2.  Bladder wall thickening not excluded. Correlate for cystitis.           Medications   cefTRIAXone (ROCEPHIN) 250 mg in lidocaine (PF) (XYLOCAINE) 1 % injection (250 mg Intramuscular Given 1/18/20 0406)   doxycycline hyclate (VIBRAMYCIN) capsule 100 mg (100 mg Oral Given 1/18/20 0402)   cefdinir (OMNICEF) capsule 300 mg (300 mg Oral Given 1/18/20 0408)           Assessments & Plan (with Medical Decision Making)   Urinary tract infection in young male. Differential diagnosis includes cystitis, prostatitis, urethritis, sexually transmitted infection, urinary tract anomalies, urolithiasis, or other. Some concern about urinary stasis with subsequent infection. Will give to cover common STD organisms and urinary pathogens and prostate infection. Advised more frequent voiding and to follow up with urology and primary care.    I have reviewed the nursing notes.    I have reviewed the findings, diagnosis, plan and need for follow up with the patient.    Discharge Medication List as of 1/18/2020  4:10 AM      START taking these medications    Details   cefdinir (OMNICEF) 300 MG capsule Take 1 capsule (300 mg) by mouth 2 times daily for 7 days, Disp-14 capsule, R-0, Local Print      doxycycline hyclate (VIBRAMYCIN) 100 MG capsule Take 1 capsule (100 mg) by mouth 2 times daily for 10 days, Disp-20 capsule, R-0, Local Print             Final diagnoses:   Urinary tract infection with hematuria, site unspecified   ISanti, am serving as a trained medical scribe to document services personally performed by Barney Doe MD, based on the provider's statements to me.      IBarney MD, was physically present and have reviewed and verified the accuracy of this note documented by Santi Hunter.     1/18/2020   [facility], [place], EMERGENCY DEPARTMENT     Barney Doe MD  02/08/20 0857

## 2020-01-18 NOTE — ED AVS SNAPSHOT
[facility], [place], Emergency Department  [address]  [facility] [address]  Phone:  [phone]  Fax:  [phone]                                    Ankit Lopez   MRN: [id number]    Department:  [facility], Emergency Department   Date of Visit:  1/18/2020           After Visit Summary Signature Page    I have received my discharge instructions, and my questions have been answered. I have discussed any challenges I see with this plan with the nurse or doctor.    ..........................................................................................................................................  Patient/Patient Representative Signature      ..........................................................................................................................................  Patient Representative Print Name and Relationship to Patient    ..................................................               ................................................  Date                                   Time    ..........................................................................................................................................  Reviewed by Signature/Title    ...................................................              ..............................................  Date                                               Time          22EPIC Rev 08/18

## 2020-01-18 NOTE — ED NOTES
Patient in bathroom called for help felt light headed after seeing blood in urine. Patient assisted to wheelchair and brought to room. Patient VSS on RA and felt better supine.

## 2020-01-18 NOTE — DISCHARGE INSTRUCTIONS
Take antibiotics as directed.  Drink plenty of fluids.  Try to empty your bladder more frequently  Follow up with your primary care clinic provider or at clinic below.  Return if persistent symptoms.    Please make an appointment to follow up with [facility] (phone: [phone]

## 2020-01-19 LAB
BACTERIA SPEC CULT: ABNORMAL
SPECIMEN SOURCE: ABNORMAL

## 2020-01-20 NOTE — RESULT ENCOUNTER NOTE
Final Urine Culture Report on 1/20/20  Emergency Dep/Urgent Care discharge antibiotic prescribed: Cefdinir (Omnicef) 300 mg capsule, 1 capsule (300 mg) by mouth 2 times daily for 7 days AND Doxycycline 100 mg PO tablet, 1 tablet (100 mg) by mouth 2 times daily for 10 days  #1. Bacteria, 50,000 to 100,000 colonies/ml Escherichia coli, is SUSCEPTIBLE to Antibiotic - Cefdinir    As per [place] ED Lab Result protocol, no change in antibiotic therapy.

## 2023-07-08 NOTE — ED NOTES
His ACS NSQIP risk score is less than average risk of cardiovascular complications.     His blood pressure is elevated.  I will ask that he increase his lisinopril from 10 to 20 mg po daily, starting today.  His elevated BP today should not preclude him proceeding with surgery tomorrow.     He has been bleeding for over a month.  I will check his CBC, CMP, PT, ECG and CXR today.    He is a smoker.  He has a hx of cirrhosis and esophageal varices.     Please send a copy of this consultation to Dr. Ibrahim and the [facility] preop area.    Patient reported abdominal pain and bilateral pitting edema on ankles. Patient reported that feeling bloated no history of vomiting and fever, few days, patient noticed enlarging of abdomen with intermittent episodes of diarrhea. 3 days, patient noticed ankle swelling   Yes